# Patient Record
Sex: FEMALE | Race: WHITE | NOT HISPANIC OR LATINO | Employment: OTHER | URBAN - METROPOLITAN AREA
[De-identification: names, ages, dates, MRNs, and addresses within clinical notes are randomized per-mention and may not be internally consistent; named-entity substitution may affect disease eponyms.]

---

## 2017-01-09 ENCOUNTER — ALLSCRIPTS OFFICE VISIT (OUTPATIENT)
Dept: OTHER | Facility: OTHER | Age: 57
End: 2017-01-09

## 2017-01-11 ENCOUNTER — HOSPITAL ENCOUNTER (EMERGENCY)
Facility: HOSPITAL | Age: 57
Discharge: HOME | End: 2017-01-11
Attending: EMERGENCY MEDICINE | Admitting: EMERGENCY MEDICINE
Payer: COMMERCIAL

## 2017-01-11 ENCOUNTER — APPOINTMENT (EMERGENCY)
Dept: RADIOLOGY | Facility: HOSPITAL | Age: 57
End: 2017-01-11
Payer: COMMERCIAL

## 2017-01-11 VITALS
HEART RATE: 72 BPM | SYSTOLIC BLOOD PRESSURE: 122 MMHG | DIASTOLIC BLOOD PRESSURE: 67 MMHG | OXYGEN SATURATION: 98 % | TEMPERATURE: 98.2 F | WEIGHT: 200.06 LBS | RESPIRATION RATE: 15 BRPM

## 2017-01-11 DIAGNOSIS — M25.569 KNEE PAIN, ACUTE: Primary | ICD-10-CM

## 2017-01-11 PROCEDURE — 99283 EMERGENCY DEPT VISIT LOW MDM: CPT

## 2017-01-11 PROCEDURE — 73564 X-RAY EXAM KNEE 4 OR MORE: CPT

## 2017-01-11 RX ORDER — FOLIC ACID/VIT B COMPLEX AND C 0.8 MG
1 TABLET ORAL DAILY
COMMUNITY
Start: 2014-12-02

## 2017-01-11 RX ORDER — MONTELUKAST SODIUM 10 MG/1
TABLET ORAL
COMMUNITY
Start: 2016-12-19

## 2017-01-11 RX ORDER — GABAPENTIN 100 MG/1
100 CAPSULE ORAL
COMMUNITY

## 2017-01-11 RX ORDER — ALBUTEROL SULFATE 90 UG/1
2 AEROSOL, METERED RESPIRATORY (INHALATION) EVERY 6 HOURS PRN
COMMUNITY

## 2017-01-11 RX ORDER — OMEPRAZOLE 20 MG/1
20 CAPSULE, DELAYED RELEASE ORAL DAILY PRN
COMMUNITY
Start: 2014-03-28

## 2017-01-11 RX ORDER — ASPIRIN 81 MG
TABLET, DELAYED RELEASE (ENTERIC COATED) ORAL 2 TIMES DAILY
COMMUNITY
Start: 2014-12-19

## 2017-01-19 ENCOUNTER — TRANSCRIBE ORDERS (OUTPATIENT)
Dept: ADMINISTRATIVE | Facility: HOSPITAL | Age: 57
End: 2017-01-19

## 2017-01-19 ENCOUNTER — APPOINTMENT (OUTPATIENT)
Dept: LAB | Facility: HOSPITAL | Age: 57
End: 2017-01-19
Payer: COMMERCIAL

## 2017-01-19 DIAGNOSIS — K29.70 GASTRITIS WITHOUT BLEEDING: ICD-10-CM

## 2017-01-19 DIAGNOSIS — Z79.899 ENCOUNTER FOR LONG-TERM (CURRENT) USE OF OTHER MEDICATIONS: ICD-10-CM

## 2017-01-19 DIAGNOSIS — E55.9 UNSPECIFIED VITAMIN D DEFICIENCY: ICD-10-CM

## 2017-01-19 DIAGNOSIS — Z98.84 BARIATRIC SURGERY STATUS: ICD-10-CM

## 2017-01-19 DIAGNOSIS — E78.5 HYPERLIPIDEMIA: ICD-10-CM

## 2017-01-19 DIAGNOSIS — K91.2 OTHER AND UNSPECIFIED POSTSURGICAL NONABSORPTION: ICD-10-CM

## 2017-01-19 DIAGNOSIS — Z79.899 ENCOUNTER FOR LONG-TERM (CURRENT) USE OF OTHER MEDICATIONS: Primary | ICD-10-CM

## 2017-01-19 DIAGNOSIS — E55.9 VITAMIN D DEFICIENCY: ICD-10-CM

## 2017-01-19 LAB
25(OH)D3 SERPL-MCNC: 40.6 NG/ML (ref 30–100)
ALBUMIN SERPL BCP-MCNC: 3.2 G/DL (ref 3.5–5)
ALP SERPL-CCNC: 79 U/L (ref 46–116)
ALT SERPL W P-5'-P-CCNC: 20 U/L (ref 12–78)
ANION GAP SERPL CALCULATED.3IONS-SCNC: 5 MMOL/L (ref 4–13)
AST SERPL W P-5'-P-CCNC: 18 U/L (ref 5–45)
BASOPHILS # BLD AUTO: 0.02 THOUSANDS/ΜL (ref 0–0.1)
BASOPHILS NFR BLD AUTO: 0 % (ref 0–1)
BILIRUB SERPL-MCNC: 0.48 MG/DL (ref 0.2–1)
BUN SERPL-MCNC: 12 MG/DL (ref 5–25)
CALCIUM SERPL-MCNC: 9 MG/DL (ref 8.3–10.1)
CHLORIDE SERPL-SCNC: 107 MMOL/L (ref 100–108)
CHOLEST SERPL-MCNC: 172 MG/DL (ref 50–200)
CO2 SERPL-SCNC: 30 MMOL/L (ref 21–32)
CREAT SERPL-MCNC: 0.7 MG/DL (ref 0.6–1.3)
EOSINOPHIL # BLD AUTO: 0.09 THOUSAND/ΜL (ref 0–0.61)
EOSINOPHIL NFR BLD AUTO: 2 % (ref 0–6)
ERYTHROCYTE [DISTWIDTH] IN BLOOD BY AUTOMATED COUNT: 13.2 % (ref 11.6–15.1)
EST. AVERAGE GLUCOSE BLD GHB EST-MCNC: 114 MG/DL
FERRITIN SERPL-MCNC: 47 NG/ML (ref 8–388)
FOLATE SERPL-MCNC: >20 NG/ML (ref 3.1–17.5)
GFR SERPL CREATININE-BSD FRML MDRD: >60 ML/MIN/1.73SQ M
GLUCOSE SERPL-MCNC: 83 MG/DL (ref 65–140)
HBA1C MFR BLD: 5.6 % (ref 4.2–6.3)
HCT VFR BLD AUTO: 38.4 % (ref 34.8–46.1)
HDLC SERPL-MCNC: 68 MG/DL (ref 40–60)
HGB BLD-MCNC: 12.6 G/DL (ref 11.5–15.4)
IRON SATN MFR SERPL: 31 %
IRON SERPL-MCNC: 112 UG/DL (ref 50–170)
LDLC SERPL CALC-MCNC: 96 MG/DL (ref 0–100)
LYMPHOCYTES # BLD AUTO: 1.45 THOUSANDS/ΜL (ref 0.6–4.47)
LYMPHOCYTES NFR BLD AUTO: 27 % (ref 14–44)
MCH RBC QN AUTO: 31.5 PG (ref 26.8–34.3)
MCHC RBC AUTO-ENTMCNC: 32.8 G/DL (ref 31.4–37.4)
MCV RBC AUTO: 96 FL (ref 82–98)
MONOCYTES # BLD AUTO: 0.51 THOUSAND/ΜL (ref 0.17–1.22)
MONOCYTES NFR BLD AUTO: 10 % (ref 4–12)
NEUTROPHILS # BLD AUTO: 3.26 THOUSANDS/ΜL (ref 1.85–7.62)
NEUTS SEG NFR BLD AUTO: 61 % (ref 43–75)
NRBC BLD AUTO-RTO: 0 /100 WBCS
PLATELET # BLD AUTO: 210 THOUSANDS/UL (ref 149–390)
PMV BLD AUTO: 12.3 FL (ref 8.9–12.7)
POTASSIUM SERPL-SCNC: 4 MMOL/L (ref 3.5–5.3)
PROT SERPL-MCNC: 7.1 G/DL (ref 6.4–8.2)
PTH-INTACT SERPL-MCNC: 47.3 PG/ML (ref 14–72)
RBC # BLD AUTO: 4 MILLION/UL (ref 3.81–5.12)
SODIUM SERPL-SCNC: 142 MMOL/L (ref 136–145)
T4 FREE SERPL-MCNC: 0.96 NG/DL (ref 0.76–1.46)
TIBC SERPL-MCNC: 356 UG/DL (ref 250–450)
TRIGL SERPL-MCNC: 41 MG/DL
TSH SERPL DL<=0.05 MIU/L-ACNC: 0.89 UIU/ML (ref 0.36–3.74)
VIT B12 SERPL-MCNC: 1077 PG/ML (ref 100–900)
WBC # BLD AUTO: 5.33 THOUSAND/UL (ref 4.31–10.16)

## 2017-01-19 PROCEDURE — 82728 ASSAY OF FERRITIN: CPT

## 2017-01-19 PROCEDURE — 84443 ASSAY THYROID STIM HORMONE: CPT

## 2017-01-19 PROCEDURE — 80053 COMPREHEN METABOLIC PANEL: CPT

## 2017-01-19 PROCEDURE — 85025 COMPLETE CBC W/AUTO DIFF WBC: CPT

## 2017-01-19 PROCEDURE — 82306 VITAMIN D 25 HYDROXY: CPT

## 2017-01-19 PROCEDURE — 84590 ASSAY OF VITAMIN A: CPT

## 2017-01-19 PROCEDURE — 36415 COLL VENOUS BLD VENIPUNCTURE: CPT

## 2017-01-19 PROCEDURE — 83970 ASSAY OF PARATHORMONE: CPT

## 2017-01-19 PROCEDURE — 83540 ASSAY OF IRON: CPT

## 2017-01-19 PROCEDURE — 82746 ASSAY OF FOLIC ACID SERUM: CPT

## 2017-01-19 PROCEDURE — 83550 IRON BINDING TEST: CPT

## 2017-01-19 PROCEDURE — 84439 ASSAY OF FREE THYROXINE: CPT

## 2017-01-19 PROCEDURE — 82607 VITAMIN B-12: CPT

## 2017-01-19 PROCEDURE — 83036 HEMOGLOBIN GLYCOSYLATED A1C: CPT

## 2017-01-19 PROCEDURE — 80061 LIPID PANEL: CPT

## 2017-01-22 LAB — VIT A SERPL-MCNC: 45 UG/DL (ref 20–65)

## 2017-02-02 ENCOUNTER — GENERIC CONVERSION - ENCOUNTER (OUTPATIENT)
Dept: OTHER | Facility: OTHER | Age: 57
End: 2017-02-02

## 2017-02-24 ENCOUNTER — HOSPITAL ENCOUNTER (EMERGENCY)
Facility: HOSPITAL | Age: 57
Discharge: HOME | End: 2017-02-24
Admitting: EMERGENCY MEDICINE
Payer: COMMERCIAL

## 2017-02-24 ENCOUNTER — GENERIC CONVERSION - ENCOUNTER (OUTPATIENT)
Dept: OTHER | Facility: OTHER | Age: 57
End: 2017-02-24

## 2017-02-24 ENCOUNTER — APPOINTMENT (EMERGENCY)
Dept: RADIOLOGY | Facility: HOSPITAL | Age: 57
End: 2017-02-24
Payer: COMMERCIAL

## 2017-02-24 VITALS
DIASTOLIC BLOOD PRESSURE: 60 MMHG | TEMPERATURE: 98.4 F | RESPIRATION RATE: 16 BRPM | SYSTOLIC BLOOD PRESSURE: 128 MMHG | WEIGHT: 195 LBS | OXYGEN SATURATION: 98 % | HEART RATE: 60 BPM

## 2017-02-24 DIAGNOSIS — S30.0XXA COCCYX CONTUSION, INITIAL ENCOUNTER: Primary | ICD-10-CM

## 2017-02-24 PROCEDURE — 99283 EMERGENCY DEPT VISIT LOW MDM: CPT

## 2017-02-24 PROCEDURE — 72220 X-RAY EXAM SACRUM TAILBONE: CPT

## 2017-02-24 RX ORDER — ACETAMINOPHEN 325 MG/1
975 TABLET ORAL ONCE
Status: COMPLETED | OUTPATIENT
Start: 2017-02-24 | End: 2017-02-24

## 2017-02-24 RX ADMIN — ACETAMINOPHEN 975 MG: 325 TABLET, FILM COATED ORAL at 16:54

## 2017-02-27 ENCOUNTER — ALLSCRIPTS OFFICE VISIT (OUTPATIENT)
Dept: OTHER | Facility: OTHER | Age: 57
End: 2017-02-27

## 2017-03-02 ENCOUNTER — ALLSCRIPTS OFFICE VISIT (OUTPATIENT)
Dept: OTHER | Facility: OTHER | Age: 57
End: 2017-03-02

## 2017-03-02 ENCOUNTER — GENERIC CONVERSION - ENCOUNTER (OUTPATIENT)
Dept: OTHER | Facility: OTHER | Age: 57
End: 2017-03-02

## 2017-03-08 ENCOUNTER — ALLSCRIPTS OFFICE VISIT (OUTPATIENT)
Dept: OTHER | Facility: OTHER | Age: 57
End: 2017-03-08

## 2017-03-10 ENCOUNTER — ALLSCRIPTS OFFICE VISIT (OUTPATIENT)
Dept: OTHER | Facility: OTHER | Age: 57
End: 2017-03-10

## 2017-04-07 ENCOUNTER — ALLSCRIPTS OFFICE VISIT (OUTPATIENT)
Dept: OTHER | Facility: OTHER | Age: 57
End: 2017-04-07

## 2017-04-11 ENCOUNTER — GENERIC CONVERSION - ENCOUNTER (OUTPATIENT)
Dept: OTHER | Facility: OTHER | Age: 57
End: 2017-04-11

## 2017-06-18 ENCOUNTER — HOSPITAL ENCOUNTER (EMERGENCY)
Facility: HOSPITAL | Age: 57
Discharge: HOME | End: 2017-06-18
Payer: COMMERCIAL

## 2017-06-18 ENCOUNTER — APPOINTMENT (EMERGENCY)
Dept: RADIOLOGY | Facility: HOSPITAL | Age: 57
End: 2017-06-18
Payer: COMMERCIAL

## 2017-06-18 VITALS
HEART RATE: 61 BPM | RESPIRATION RATE: 18 BRPM | TEMPERATURE: 99 F | WEIGHT: 199 LBS | SYSTOLIC BLOOD PRESSURE: 128 MMHG | OXYGEN SATURATION: 97 % | DIASTOLIC BLOOD PRESSURE: 77 MMHG

## 2017-06-18 DIAGNOSIS — S49.91XA RIGHT SHOULDER INJURY, INITIAL ENCOUNTER: Primary | ICD-10-CM

## 2017-06-18 PROCEDURE — 99283 EMERGENCY DEPT VISIT LOW MDM: CPT

## 2017-06-18 PROCEDURE — 73030 X-RAY EXAM OF SHOULDER: CPT

## 2017-06-18 RX ORDER — TRAMADOL HYDROCHLORIDE 50 MG/1
50 TABLET ORAL EVERY 6 HOURS PRN
Qty: 12 TABLET | Refills: 0 | Status: SHIPPED | OUTPATIENT
Start: 2017-06-18 | End: 2017-06-21

## 2017-07-18 ENCOUNTER — ALLSCRIPTS OFFICE VISIT (OUTPATIENT)
Dept: OTHER | Facility: OTHER | Age: 57
End: 2017-07-18

## 2017-07-18 DIAGNOSIS — M25.511 PAIN IN RIGHT SHOULDER: ICD-10-CM

## 2017-07-18 DIAGNOSIS — Z01.419 ENCOUNTER FOR GYNECOLOGICAL EXAMINATION WITHOUT ABNORMAL FINDING: ICD-10-CM

## 2017-07-18 DIAGNOSIS — Z12.31 ENCOUNTER FOR SCREENING MAMMOGRAM FOR MALIGNANT NEOPLASM OF BREAST: ICD-10-CM

## 2017-07-18 DIAGNOSIS — Z72.51 HIGH RISK HETEROSEXUAL BEHAVIOR: ICD-10-CM

## 2017-07-18 DIAGNOSIS — M54.50 LOW BACK PAIN: ICD-10-CM

## 2017-07-24 ENCOUNTER — APPOINTMENT (OUTPATIENT)
Dept: PHYSICAL THERAPY | Facility: CLINIC | Age: 57
End: 2017-07-24
Payer: COMMERCIAL

## 2017-07-24 DIAGNOSIS — M25.511 PAIN IN RIGHT SHOULDER: ICD-10-CM

## 2017-07-24 DIAGNOSIS — M54.50 LOW BACK PAIN: ICD-10-CM

## 2017-07-24 PROCEDURE — 97161 PT EVAL LOW COMPLEX 20 MIN: CPT

## 2017-07-25 ENCOUNTER — APPOINTMENT (OUTPATIENT)
Dept: PHYSICAL THERAPY | Facility: CLINIC | Age: 57
End: 2017-07-25
Payer: COMMERCIAL

## 2017-07-26 ENCOUNTER — APPOINTMENT (OUTPATIENT)
Dept: PHYSICAL THERAPY | Facility: CLINIC | Age: 57
End: 2017-07-26
Payer: COMMERCIAL

## 2017-07-26 PROCEDURE — 97110 THERAPEUTIC EXERCISES: CPT

## 2017-07-26 PROCEDURE — 97140 MANUAL THERAPY 1/> REGIONS: CPT

## 2017-08-02 ENCOUNTER — APPOINTMENT (OUTPATIENT)
Dept: PHYSICAL THERAPY | Facility: CLINIC | Age: 57
End: 2017-08-02
Payer: COMMERCIAL

## 2017-08-02 PROCEDURE — 97110 THERAPEUTIC EXERCISES: CPT

## 2017-08-02 PROCEDURE — 97140 MANUAL THERAPY 1/> REGIONS: CPT

## 2017-08-03 ENCOUNTER — APPOINTMENT (OUTPATIENT)
Dept: PHYSICAL THERAPY | Facility: CLINIC | Age: 57
End: 2017-08-03
Payer: COMMERCIAL

## 2017-08-03 PROCEDURE — 97140 MANUAL THERAPY 1/> REGIONS: CPT

## 2017-08-03 PROCEDURE — 97110 THERAPEUTIC EXERCISES: CPT

## 2017-08-11 ENCOUNTER — GENERIC CONVERSION - ENCOUNTER (OUTPATIENT)
Dept: OTHER | Facility: OTHER | Age: 57
End: 2017-08-11

## 2017-08-16 ENCOUNTER — APPOINTMENT (OUTPATIENT)
Dept: LAB | Facility: HOSPITAL | Age: 57
End: 2017-08-16
Payer: COMMERCIAL

## 2017-08-16 ENCOUNTER — ALLSCRIPTS OFFICE VISIT (OUTPATIENT)
Dept: OTHER | Facility: OTHER | Age: 57
End: 2017-08-16

## 2017-08-16 DIAGNOSIS — Z72.51 HIGH RISK HETEROSEXUAL BEHAVIOR: ICD-10-CM

## 2017-08-16 LAB
BILIRUB UR QL STRIP: NORMAL
CLARITY UR: NORMAL
COLOR UR: YELLOW
GLUCOSE (HISTORICAL): NORMAL
HGB UR QL STRIP.AUTO: NORMAL
KETONES UR STRIP-MCNC: NORMAL MG/DL
LEUKOCYTE ESTERASE UR QL STRIP: NORMAL
NITRITE UR QL STRIP: NORMAL
PH UR STRIP.AUTO: 7 [PH]
PROT UR STRIP-MCNC: NORMAL MG/DL
SP GR UR STRIP.AUTO: 1.01
UROBILINOGEN UR QL STRIP.AUTO: 0.2

## 2017-08-16 PROCEDURE — 87491 CHLMYD TRACH DNA AMP PROBE: CPT

## 2017-08-16 PROCEDURE — 87624 HPV HI-RISK TYP POOLED RSLT: CPT | Performed by: NURSE PRACTITIONER

## 2017-08-16 PROCEDURE — G0145 SCR C/V CYTO,THINLAYER,RESCR: HCPCS | Performed by: NURSE PRACTITIONER

## 2017-08-16 PROCEDURE — 87591 N.GONORRHOEAE DNA AMP PROB: CPT

## 2017-08-17 ENCOUNTER — LAB REQUISITION (OUTPATIENT)
Dept: LAB | Facility: HOSPITAL | Age: 57
End: 2017-08-17
Payer: COMMERCIAL

## 2017-08-17 DIAGNOSIS — Z01.419 ENCOUNTER FOR GYNECOLOGICAL EXAMINATION WITHOUT ABNORMAL FINDING: ICD-10-CM

## 2017-08-17 LAB
CHLAMYDIA DNA CVX QL NAA+PROBE: NORMAL
N GONORRHOEA DNA GENITAL QL NAA+PROBE: NORMAL

## 2017-08-22 LAB — HPV RRNA GENITAL QL NAA+PROBE: ABNORMAL

## 2017-08-24 LAB
LAB AP GYN PRIMARY INTERPRETATION: NORMAL
Lab: NORMAL
PATH INTERP SPEC-IMP: NORMAL

## 2017-08-25 ENCOUNTER — GENERIC CONVERSION - ENCOUNTER (OUTPATIENT)
Dept: OTHER | Facility: OTHER | Age: 57
End: 2017-08-25

## 2017-08-28 ENCOUNTER — GENERIC CONVERSION - ENCOUNTER (OUTPATIENT)
Dept: OTHER | Facility: OTHER | Age: 57
End: 2017-08-28

## 2017-09-21 ENCOUNTER — GENERIC CONVERSION - ENCOUNTER (OUTPATIENT)
Dept: OTHER | Facility: OTHER | Age: 57
End: 2017-09-21

## 2017-09-25 ENCOUNTER — GENERIC CONVERSION - ENCOUNTER (OUTPATIENT)
Dept: OTHER | Facility: OTHER | Age: 57
End: 2017-09-25

## 2018-01-10 NOTE — PROGRESS NOTES
Message  Pt left voicemail this am cancelling today's nutrition appointment  Active Problems    1  Abnormal chest CT (793 2) (R93 8)   2  Allergic rhinitis (477 9) (J30 9)   3  Asthma (493 90) (J45 909)   4  Back pain (724 5) (M54 9)   5  Carpal tunnel syndrome on both sides (354 0) (G56 01,G56 02)   6  Chills (780 64) (R68 83)   7  Chronic obstructive pulmonary disease (496) (J44 9)   8  Constipation (564 00) (K59 00)   9  Diabetes mellitus (250 00) (E11 9)   10  Edema (782 3) (R60 9)   11  Encounter for routine gynecological examination with Papanicolaou smear of cervix    (V72 31,V76 2) (Z01 419,Z12 4)   12  Encounter for screening mammogram for malignant neoplasm of breast (V76 12)    (Z12 31)   13  Fatigue (780 79) (R53 83)   14  Gastritis (535 50) (K29 70)   15  Group B streptococcal infection (041 02) (B95 1)   16  Herniated cervical disc (722 0) (M50 20)   17  Hyperlipidemia (272 4) (E78 5)   18  Left maxillary sinusitis (473 0) (J32 0)   19  Low back pain (724 2) (M54 5)   20  Lumbar radiculopathy (724 4) (M54 16)   21  Need for prophylactic vaccination and inoculation against influenza (V04 81) (Z23)   22  Nonspecific abnormal findings on radiological and examination of lung field (793 19)    (R91 8)   23  Obstructive sleep apnea (327 23) (G47 33)   24  Onychomycosis (110 1) (B35 1)   25  Osteoarthritis cervical spine (721 0) (M47 812)   26  Pain of lower leg, unspecified laterality (729 5) (M79 669)   27  Palpitations (785 1) (R00 2)   28  Periorbital cellulitis of right eye (682 0) (L03 211)   29  Postgastrectomy malabsorption (579 3) (K91 2)   30  Preoperative clearance (V72 84) (Z01 818)   31  Pulmonary embolism (415 19) (I26 99)   32  Right bundle branch block (RBBB) (426 4) (I45 10)   33  Right eye injury (921 9) (S05 91XA)   34  Screen for sexually transmitted diseases (V74 5) (Z11 3)   35  Shortness of breath (786 05) (R06 02)   36  Sinus bradycardia (427 89) (R00 1)   37   Status post bariatric surgery (V45 86) (Z98 84)   38  Subcutaneous cyst (706 2) (L72 9)   39  Tinea cruris (110 3) (B35 6)   40  Transient paralysis (781 4) (R29 5)   41  Trigger finger (727 03) (M65 30)   42  Vertigo (780 4) (R42)   43  Vitamin D deficiency (268 9) (E55 9)    Current Meds   1  Baclofen 10 MG Oral Tablet; take 1 tablet 3 times daily as needed for muscle spasm; Therapy: 28TFI5887 to (Paola Bowie)  Requested for: 73TLE9663; Last   Rx:62Zsh9815 Ordered   2  Calcium Citrate + D3 TABS; TAKE 1 TABLET 3 times daily; Therapy: (Recorded:17Nov2015) to Recorded   3  Clotrimazole 1 % External Cream (Lotrimin AF); APPLY 2-3 TIMES DAILY TO   AFFECTED AREA(S); Therapy: 68GNU2206 to (Berenice uHnter)  Requested for: 40Maz9019; Last   Rx:75Lcy1696; Status: ACTIVE - Renewal Denied Ordered   4  DocQLace 100 MG Oral Capsule; TAKE 1 CAPSULE TWICE DAILY; Therapy: 21PMW1060 to (Angle An)  Requested for: 56Osw7199; Last   Rx:43Azh5981 Ordered   5  Docusate Sodium 100 MG Oral Capsule; TAKE 1 CAPSULE TWICE DAILY; Therapy: 53CHQ3976 to (Evaluate:17Feb2016)  Requested for: 39ZIR2611; Last   Rx:20Oct2015 Ordered   6  FreeStyle Lancets Miscellaneous; Therapy: 78TJU2311 to (Evaluate:22Jun2014) Recorded   7  FreeStyle Lite Device; Therapy: 29OZZ6351 to (Evaluate:22Jun2014) Recorded   8  FreeStyle Lite Test In [employer]; Therapy: 22TCK9778 to (Evaluate:17Jun2014) Recorded   9  Loratadine 10 MG Oral Tablet; Take 1 tablet daily; Therapy: 16MKA1397 to (Brigitte Barreto)  Requested for: 56YNY8403; Last   AA:94FBN8755 Ordered   10  Montelukast Sodium 10 MG Oral Tablet; take 1 tablet at bedtime; Therapy: 68UJW5970 to (Brigitte Barreto)  Requested for: 45RKM9332; Last    Rx:07Jan2016 Ordered   6  Multivitamins CHEW Recorded   12  Nyamyc 692633 UNIT/GM External Powder; apply sparingly to affected area(s) 3 times a    day; Therapy: 48Uff9911 to ([phone])  Requested for: 45Tyr1979;  Last Rx:07Lka5599 Ordered   13  Nystatin 841986 UNIT/GM External Powder; APPLY SPARINGLY TO AFFECTED    AREA(S) 3 TIMES A DAY; Therapy: 09JOY9823 to ([phone])  Requested for: 27Aug2015; Last    Rx:27Aug2015 Ordered   14  Omeprazole 20 MG Oral Capsule Delayed Release; Take one capsule daily; Therapy: 59QYA7860 to (Evaluate:21Mar2016)  Requested for: 67FUU0060; Last    Rx:57Cov2556 Ordered   15  Sertraline HCl - 100 MG Oral Tablet; TAKE 1 AND 1/2 TABLETS DAILY; Therapy: (Recorded:29Jan2014) to Recorded   16  Symbicort 80-4 5 MCG/ACT Inhalation Aerosol; inhale 2 puffs twice daily  rinse mouth    after use; Therapy: 18IOV2978 to (Abril Miguel)  Requested for: 62UJR2934; Last    Rx:56Anu9850 Ordered   17  TraZODone HCl - 100 MG Oral Tablet; as directed; Therapy: (Recorded:17Nov2015) to Recorded   18  Ventolin  (90 Base) MCG/ACT Inhalation Aerosol Solution; INHALE 2 PUFFS    EVERY 4-6 HOURS AS NEEDED; Therapy: 40SYT0596 to (Complete:25Aug2016)  Requested for: 34HSM3900; Last    Rx:10Vhh2567; Status: ACTIVE - Renewal Denied Ordered   19  Xarelto 20 MG Oral Tablet; take one tablet daily with food; Therapy: 53FYH9024 to (Abril Miguel)  Requested for: 57DMQ9242; Last    Rx:76Uyx1601 Ordered    Allergies    1  Neosporin + Pain Relief Max St OINT    2   Adhesive Tape    Signatures   Electronically signed by : JOEY Rebolledo; Feb 11 2016 10:21AM EST                       (Author)

## 2018-01-11 NOTE — PROGRESS NOTES
Chief Complaint  Here for PPd read, noted as negative on left forearm  Pt  does not have any forms, state she will bring one on visit for 2 step PPd placement  ENRIQUETA Cobian  Active Problems    1  Abnormal chest CT (793 2) (R93 8)   2  Achilles tendinitis of left lower extremity (726 71) (M76 62)   3  Achilles tendinitis of right lower extremity (726 71) (M76 61)   4  Allergic rhinitis (477 9) (J30 9)   5  Anxiety (300 00) (F41 9)   6  Asthma (493 90) (J45 909)   7  Back pain (724 5) (M54 9)   8  Carpal tunnel syndrome on both sides (354 0) (G56 03)   9  Chest pain (786 50) (R07 9)   10  Chills (780 64) (R68 83)   11  Chronic obstructive pulmonary disease (496) (J44 9)   12  Colon cancer screening (V76 51) (Z12 11)   13  Constipation (564 00) (K59 00)   14  Depression (311) (F32 9)   15  Edema (782 3) (R60 9)   16  Encounter for diabetic foot exam (250 00) (E11 9)   17  Encounter for routine gynecological examination with Papanicolaou smear of cervix    (V72 31,V76 2) (Z01 419)   18  Encounter for screening mammogram for malignant neoplasm of breast (V76 12)    (Z12 31)   19  Fatigue (780 79) (R53 83)   20  Gastritis (535 50) (K29 70)   21  Group B streptococcal infection (041 02) (A49 1)   22  Herniated cervical disc (722 0) (M50 20)   23  Hyperlipidemia (272 4) (E78 5)   24  Incomplete right bundle branch block (426 4) (I45 10)   25  Iron deficiency anemia (280 9) (D50 9)   26  Left maxillary sinusitis (473 0) (J32 0)   27  Low back pain (724 2) (M54 5)   28  Lumbar radiculopathy (724 4) (M54 16)   29  Need for influenza vaccination (V04 81) (Z23)   30  Need for prophylactic vaccination and inoculation against influenza (V04 81) (Z23)   31  Nonspecific abnormal findings on radiological and examination of lung field (793 19)    (R91 8)   32  Obstructive sleep apnea (327 23) (G47 33)   33  Onychomycosis (110 1) (B35 1)   34  Osteoarthritis cervical spine (721 0) (M47 812)   35   Pain of lower leg, unspecified laterality (729 5) (M79 669)   36  Palpitations (785 1) (R00 2)   37  Periorbital cellulitis of right eye (682 0) (L03 213)   38  Postgastrectomy malabsorption (579 3) (K91 2,Z90 3)   39  PPD screening test (V74 1) (Z11 1)   40  Preoperative clearance (V72 84) (Z01 818)   41  Pulmonary embolism (415 19) (I26 99)   42  Right eye injury (921 9) (S05 91XA)   43  Screen for sexually transmitted diseases (V74 5) (Z11 3)   44  Shortness of breath (786 05) (R06 02)   45  Sinus bradycardia (427 89) (R00 1)   46  Status post bariatric surgery (V45 86) (Z98 84)   47  Strain of Achilles tendon, left, initial encounter (845 09) (S86 012A)   48  Subcutaneous cyst (706 2) (L72 9)   49  Tinea cruris (110 3) (B35 6)   50  Transient paralysis (781 4) (R29 5)   51  Trigger finger (727 03) (M65 30)   52  Vertigo (780 4) (R42)   53  Visit for screening mammogram (V76 12) (Z12 31)   54  Vitamin D deficiency (268 9) (E55 9)   55  Well adult on routine health check (V70 0) (Z00 00)    Current Meds   1  Baclofen 10 MG Oral Tablet; take 1 tablet 3 times daily as needed for muscle spasm; Therapy: 64RHI2455 to (Evaluate:04Kne8360)  Requested for: 28YDS2578; Last   EC:56DKI7871 Ordered   2  Calcium Citrate + D3 TABS; TAKE 1 TABLET 3 times daily; Therapy: (Recorded:10Xyx8249) to Recorded   3  DocQLace 100 MG Oral Capsule; TAKE 1 CAPSULE TWICE DAILY; Therapy: 07ZYM2391 to (Evaluate:67Kcv8626)  Requested for: 70Oqc2123; Last   Rx:45Jrk5440 Ordered   4  Docusate Sodium 100 MG Oral Capsule; TAKE 1 CAPSULE TWICE DAILY; Therapy: 32OPN3699 to (Evaluate:62Uih4024)  Requested for: 70ZOI4293; Last   Rx:14Qhp9944 Ordered   5  FreeStyle Lancets Miscellaneous; Therapy: 72TRS7478 to (Evaluate:22Jun2014) Recorded   6  FreeStyle Lite Device; Therapy: 23CVT8635 to (Evaluate:22Jun2014) Recorded   7  FreeStyle Lite Test In [employer]; Therapy: 63PPD3587 to (Evaluate:17Jun2014) Recorded   8   Iron Supplement 325 (65 Fe) MG TABS; TAKE 1 TABLET DAILY AS DIRECTED; Therapy: 32Xso4157 to (Evaluate:76Fnb1472)  Requested for: 64Gcv9999; Last   Rx:31Slp4870 Ordered   9  Loratadine 10 MG Oral Tablet; TAKE 1 TABLET DAILY  Patient to contact PCP for   additional refills; Therapy: 09VWG4912 to (Elizabeth Preston)  Requested for: 84CXS0079; Last   Rx:09Jan2017 Ordered   10  Montelukast Sodium 10 MG Oral Tablet; TAKE 1 TABLET IN THE EVENING; Therapy: 30YXP7249 to (Evaluate:17Jun2017)  Requested for: 16SQT3597; Last    Rx:02Hsb6653 Ordered   11  Multivitamins CHEW Recorded   12  Senna Laxative 8 6 MG Oral Tablet; Take 1 tablet by mouth at bedtime as needed for    constipation; Therapy: 43Qee7581 to (Evaluate:00Tai4328)  Requested for: 05ERH0698; Last    Rx:29Rnu8061 Ordered   13  Sertraline HCl - 100 MG Oral Tablet; TAKE 1 AND 1/2 TABLETS DAILY; Therapy: (Recorded:29Jan2014) to Recorded   14  TraZODone HCl - 100 MG Oral Tablet; as directed; Therapy: (Recorded:17Nov2015) to Recorded   15  Ventolin  (90 Base) MCG/ACT Inhalation Aerosol Solution; INHALE 2 PUFFS    EVERY 4-6 HOURS AS NEEDED; Therapy: 98XYX7271 to (UJIJOGAB:75VSB6626)  Requested for: 74PWV1705; Last    Rx:09Jan2017 Ordered   16  Vitamin C 500 MG Oral Tablet; TAKE 1 TABLET DAILY; Therapy: 65Xtc0255 to (Elizabeth Preston)  Requested for: 35ZSG6878; Last    Rx:09Jan2017 Ordered   17  Vitamin D3 5000 UNIT Oral Capsule; TAKE 1 CAPSULE BY MOUTH EVERY DAY; Therapy: 23YSX6115 to (Evaluate:04Jan2018)  Requested for: 00NLN4654; Last    Rx:09Jan2017 Ordered   18  Xarelto 20 MG Oral Tablet; take one tablet daily with food; Therapy: 10YKB9074 to ([phone])  Requested for: 55IDW8765; Last    Rx:09Jan2017 Ordered    Allergies    1  Neosporin + Pain Relief Max St OINT    2  Adhesive Tape    Assessment    1   Well adult on routine health check (V70 0) (Z00 00)    Plan  Well adult on routine health check    · Follow-up visit in 1 year Evaluation and Treatment  Follow-up  Status: Complete  Done:  59NWG9793    Future Appointments    Date/Time Provider Specialty Site   03/07/2017 10:00 AM [facility]   03/09/2017 10:00 AM Jyotsna BARLOW, Nurse Schedule  [facility]   03/02/2017 01:00 PM Kenyon White, [facility] Family Medicine [facility]   03/02/2017 02:30 PM Jennifer Larry, [facility] General Surgery [facility]     Signatures   Electronically signed by : Ree Dance, [facility]; Feb 27 2017 10:53AM EST                       (Author)    Electronically signed by : EJAN Ornelas ; Feb 28 2017  3:28PM EST

## 2018-01-12 NOTE — PROGRESS NOTES
Assessment    1  Well adult on routine health check (V70 0) (Z00 00)    Plan  Well adult on routine health check    · Follow-up visit in 1 year Evaluation and Treatment  Follow-up  Status: Hold For -  Scheduling  Requested for: 57JDU3463    Discussion/Summary  Currently, she eats a healthy diet  Had a hysterectomy Breast cancer screening: mammogram is current  Colorectal cancer screening: Patient states that she had a colonoscopy done in 2014 prior to her bypass surgery  She states was done in [facility]  Patient discussion: discussed with the patient  With regards to her fall yesterday and left buttock pain, advised that she can go to the ER if symptoms worsen  Otherwise she can follow-up here next week for further evaluation and discuss constipation at that time too  In the meantime I did send a task to the clinical team to retrieve her colonoscopy results from 2014 at [facility]  Needs a two-step PPD for employment  PPD applied today and will follow up in 72 hours for reading  Patient denies any form needed for completion  Patient is able to Self-Care  The treatment plan was reviewed with the patient/guardian  The patient/guardian understands and agrees with the treatment plan   The patient was counseled regarding instructions for management, impressions  Self Referrals: No   Follow up with PCP/Referring Provider  Chief Complaint  States needs physical to do nurse aide course  History of Present Illness  HM, Adult Female: The patient is being seen for a health maintenance and 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  Also needs a two-step PPD evaluation  General Health: The patient's health since the last visit is described as good  She does not have regular dental visits  The patient reports her last dental visit was yrs ago  She complains of vision problems  She has hearing loss  hearing is slightly decreased   rt ear due to accident 14 yrs ago  Lifestyle:  She consumes a diverse and healthy diet  She does not have any weight concerns  She exercises regularly  She does not use tobacco  She denies alcohol use  She denies drug use  Reproductive health:  had hysterectomy  Screening:      Review of Systems    Constitutional: No fever, no chills, feels well, no tiredness, no recent weight gain or weight loss  Eyes: eyesight problems  ENT: no complaints of earache, no loss of hearing, no nose bleeds, no nasal discharge, no sore throat, no hoarseness  Cardiovascular: No complaints of slow heart rate, no fast heart rate, no chest pain, no palpitations, no leg claudication, no lower extremity edema  Respiratory: No complaints of shortness of breath, no wheezing, no cough, no SOB on exertion, no orthopnea, no PND  Musculoskeletal: Patient states that she fell outside on her left buttock last evening  She feels as though it may be more muscle pain  No evaluation  Active Problems    1  Abnormal chest CT (793 2) (R93 8)   2  Achilles tendinitis of left lower extremity (726 71) (M76 62)   3  Achilles tendinitis of right lower extremity (726 71) (M76 61)   4  Allergic rhinitis (477 9) (J30 9)   5  Anxiety (300 00) (F41 9)   6  Asthma (493 90) (J45 909)   7  Back pain (724 5) (M54 9)   8  Carpal tunnel syndrome on both sides (354 0) (G56 03)   9  Chest pain (786 50) (R07 9)   10  Chills (780 64) (R68 83)   11  Chronic obstructive pulmonary disease (496) (J44 9)   12  Colon cancer screening (V76 51) (Z12 11)   13  Constipation (564 00) (K59 00)   14  Depression (311) (F32 9)   15  Edema (782 3) (R60 9)   16  Encounter for diabetic foot exam (250 00) (E11 9)   17  Encounter for routine gynecological examination with Papanicolaou smear of cervix    (V72 31,V76 2) (Z01 419)   18  Encounter for screening mammogram for malignant neoplasm of breast (V76 12)    (Z12 31)   19  Fatigue (780 79) (R53 83)   20  Gastritis (535 50) (K29 70)   21   Group B streptococcal infection (041 02) (A49 1)   22  Herniated cervical disc (722 0) (M50 20)   23  Hyperlipidemia (272 4) (E78 5)   24  Incomplete right bundle branch block (426 4) (I45 10)   25  Iron deficiency anemia (280 9) (D50 9)   26  Left maxillary sinusitis (473 0) (J32 0)   27  Low back pain (724 2) (M54 5)   28  Lumbar radiculopathy (724 4) (M54 16)   29  Need for influenza vaccination (V04 81) (Z23)   30  Need for prophylactic vaccination and inoculation against influenza (V04 81) (Z23)   31  Nonspecific abnormal findings on radiological and examination of lung field (793 19)    (R91 8)   32  Obstructive sleep apnea (327 23) (G47 33)   33  Onychomycosis (110 1) (B35 1)   34  Osteoarthritis cervical spine (721 0) (M47 812)   35  Pain of lower leg, unspecified laterality (729 5) (M79 669)   36  Palpitations (785 1) (R00 2)   37  Periorbital cellulitis of right eye (682 0) (L03 213)   38  Postgastrectomy malabsorption (579 3) (K91 2,Z90 3)   39  Preoperative clearance (V72 84) (Z01 818)   40  Pulmonary embolism (415 19) (I26 99)   41  Right eye injury (921 9) (S05 91XA)   42  Screen for sexually transmitted diseases (V74 5) (Z11 3)   43  Shortness of breath (786 05) (R06 02)   44  Sinus bradycardia (427 89) (R00 1)   45  Status post bariatric surgery (V45 86) (Z98 84)   46  Strain of Achilles tendon, left, initial encounter (845 09) (S86 012A)   47  Subcutaneous cyst (706 2) (L72 9)   48  Tinea cruris (110 3) (B35 6)   49  Transient paralysis (781 4) (R29 5)   50  Trigger finger (727 03) (M65 30)   51  Vertigo (780 4) (R42)   52  Visit for screening mammogram (V76 12) (Z12 31)   53   Vitamin D deficiency (268 9) (E55 9)    Past Medical History    · History of Cardiac Cath Summary   · History of Cellulitis (682 9) (L03 90)   · History of Cellulitis of leg (682 6) (L03 119)   · History of Depression (311) (F32 9)   · History of Diabetes mellitus type II, controlled (250 00) (E11 9)   · History of hypertension (V12 59) (Z86 79)   · History of Morbid or severe obesity due to excess calories (278 01) (E66 01)   · Need for prophylactic vaccination and inoculation against influenza (V04 81) (Z23)   · History of Obstructive sleep apnea (327 23) (G47 33)   · History of Pulmonary Embolism (V12 55)    Surgical History    · History of Cholecystectomy   · History of Colon Surgery   · History of Gastric Surgery For Morbid Obesity Gastric Bypass   · History of Hysterectomy    Family History  Mother    · Patient's father is  (V24 11) (Z80 80)   · Patient's mother is  (V24 11) (Z80 80)  Father    · Family history of Chronic Renal Failure   · Family history of cardiac disorder (V17 49) (Z82 49)   · Patient's father is  (V24 11) (Z80 80)   · Patient's mother is  (V24 11) (Z80 80)  Sister    · Family history of Diabetes Mellitus (V18 0)  Family History    · Family history of Asthma (V17 5)   · Family history of Breast Cancer (V16 3)   · Family history of Depression   · Family history of Factor V Leiden Mutation, Hypercoagulation   · Family history of Reported Family History Of Heart Disease    Social History    · Daily Coffee Consumption (2  Cups/Day)   · Denied: History of Drug Use   · Former smoker (V15 82) (E46 470)   · Quit over 27 years ago  Lajean Cassette Lajean Cassette Smoked for only about a year, socially  Lajean Cassette Lajean Cassette Less than a pack of      cigarettes a day  Lajean Cassette Lajean Cassette Now around second hand smoke on a daily basis  · Never Drank Alcohol   · No preference on Latter day beliefs    Current Meds   1  Baclofen 10 MG Oral Tablet; take 1 tablet 3 times daily as needed for muscle spasm; Therapy: 59COY3437 to (Evaluate:64Duz7300)  Requested for: 09TTW5839; Last   OO:13VFZ8238 Ordered   2  Calcium Citrate + D3 TABS; TAKE 1 TABLET 3 times daily; Therapy: (Recorded:62Myq4772) to Recorded   3  DocQLace 100 MG Oral Capsule; TAKE 1 CAPSULE TWICE DAILY; Therapy: 69IUP0769 to (Evaluate:00Zpu2430)  Requested for: 99Ggj3137; Last   Rx:04Ngr7211 Ordered   4  Docusate Sodium 100 MG Oral Capsule; TAKE 1 CAPSULE TWICE DAILY; Therapy: 81UDQ2105 to (Evaluate:17Feb2016)  Requested for: 14OZB5064; Last   Rx:20Oct2015 Ordered   5  FreeStyle Lancets Miscellaneous; Therapy: 03HTY7391 to (Evaluate:22Jun2014) Recorded   6  FreeStyle Lite Device; Therapy: 85FOW1230 to (Evaluate:22Jun2014) Recorded   7  FreeStyle Lite Test In [employer]; Therapy: 32LNZ9041 to (Evaluate:17Jun2014) Recorded   8  Iron Supplement 325 (65 Fe) MG TABS; TAKE 1 TABLET DAILY AS DIRECTED; Therapy: 73Vli4949 to (Evaluate:20Aug2016)  Requested for: 26Bgt1935; Last   Rx:22Feb2016 Ordered   9  Loratadine 10 MG Oral Tablet; TAKE 1 TABLET DAILY  Patient to contact PCP for   additional refills; Therapy: 29OAR1896 to (Lorraine Parker)  Requested for: 52ESI9091; Last   Rx:09Jan2017 Ordered   10  Montelukast Sodium 10 MG Oral Tablet; TAKE 1 TABLET IN THE EVENING; Therapy: 85UDV8704 to (Evaluate:17Jun2017)  Requested for: 07DML9537; Last    Rx:97Shn3712 Ordered   11  Multivitamins CHEW Recorded   12  Senna Laxative 8 6 MG Oral Tablet; Take 1 tablet by mouth at bedtime as needed for    constipation; Therapy: 76Nfx9696 to (Evaluate:08Aug2016)  Requested for: 77DKM2406; Last    Rx:07Sir7419 Ordered   13  Sertraline HCl - 100 MG Oral Tablet; TAKE 1 AND 1/2 TABLETS DAILY; Therapy: (Recorded:29Jan2014) to Recorded   14  TraZODone HCl - 100 MG Oral Tablet; as directed; Therapy: (Recorded:17Nov2015) to Recorded   15  Ventolin  (90 Base) MCG/ACT Inhalation Aerosol Solution; INHALE 2 PUFFS    EVERY 4-6 HOURS AS NEEDED; Therapy: 52KPU8348 to (BJVEJZOA:06XQO8835)  Requested for: 14NQB9393; Last    Rx:09Jan2017 Ordered   16  Vitamin C 500 MG Oral Tablet; TAKE 1 TABLET DAILY; Therapy: 21Ido7289 to (Lorraine Parker)  Requested for: 31EMB7167; Last    Rx:09Jan2017 Ordered   17  Vitamin D3 5000 UNIT Oral Capsule; TAKE 1 CAPSULE BY MOUTH EVERY DAY;     Therapy: 98SIJ9104 to (Evaluate:04Jan2018) Requested for: 12DDY8231; Last    Rx:09Jan2017 Ordered   18  Xarelto 20 MG Oral Tablet; take one tablet daily with food; Therapy: 01WEK4700 to ([phone])  Requested for: 82NIZ4377; Last    Rx:09Jan2017 Ordered    Allergies    1  Neosporin + Pain Relief Max St OINT    2  Adhesive Tape    Physical Exam    Constitutional   General appearance: Abnormal   well developed, appears healthy, well nourished, obese and well hydrated  Ears, Nose, Mouth, and Throat   External inspection of ears and nose: Normal     Otoscopic examination: Tympanic membranes translucent with normal light reflex  Canals patent without erythema  Oropharynx: Normal with no erythema, edema, exudate or lesions  Neck   Neck: Supple, symmetric, trachea midline, no masses  Thyroid: Normal, no thyromegaly  Pulmonary   Respiratory effort: No increased work of breathing or signs of respiratory distress  Auscultation of lungs: Clear to auscultation  Cardiovascular   Auscultation of heart: Normal rate and rhythm, normal S1 and S2, no murmurs  Abdomen   Abdomen: Abnormal   Good bowel sounds, soft, mild diffuse tenderness  Patient relates her pain to constipation  Liver and spleen: No hepatomegaly or splenomegaly        Future Appointments    Date/Time Provider Specialty Site   02/27/2017 11:00 AM Carmelita BARLOW, Nurse Schedule  [facility]   03/02/2017 02:30 PM Felisha Larry, [facility] General Surgery [facility]     Signatures   Electronically signed by : Evone Phoenix, [facility]; Feb 24 2017  2:15PM EST                       (Author)    Electronically signed by : JEAN Henderson ; Feb 28 2017  3:27PM EST

## 2018-01-13 VITALS
HEIGHT: 63 IN | WEIGHT: 193 LBS | DIASTOLIC BLOOD PRESSURE: 80 MMHG | SYSTOLIC BLOOD PRESSURE: 128 MMHG | BODY MASS INDEX: 34.2 KG/M2

## 2018-01-13 VITALS
RESPIRATION RATE: 18 BRPM | OXYGEN SATURATION: 99 % | HEIGHT: 63 IN | WEIGHT: 198 LBS | TEMPERATURE: 99.8 F | BODY MASS INDEX: 35.08 KG/M2 | HEART RATE: 72 BPM | SYSTOLIC BLOOD PRESSURE: 110 MMHG | DIASTOLIC BLOOD PRESSURE: 70 MMHG

## 2018-01-13 VITALS
WEIGHT: 203 LBS | TEMPERATURE: 97 F | BODY MASS INDEX: 34.66 KG/M2 | RESPIRATION RATE: 20 BRPM | OXYGEN SATURATION: 100 % | HEART RATE: 68 BPM | DIASTOLIC BLOOD PRESSURE: 68 MMHG | SYSTOLIC BLOOD PRESSURE: 102 MMHG | HEIGHT: 64 IN

## 2018-01-13 VITALS
SYSTOLIC BLOOD PRESSURE: 106 MMHG | DIASTOLIC BLOOD PRESSURE: 64 MMHG | WEIGHT: 201 LBS | HEART RATE: 72 BPM | HEIGHT: 63 IN | RESPIRATION RATE: 20 BRPM | TEMPERATURE: 97.2 F | BODY MASS INDEX: 35.61 KG/M2

## 2018-01-13 NOTE — RESULT NOTES
Verified Results  (1) THIN PREP PAP WITH IMAGING [id number] 12:11PM Nel Eden     Test Name Result Flag Reference   LAB AP CASE REPORT (Report)     Gynecologic Cytology Report            Case: [id number]                  Authorizing Provider: SOPHIA Alvarado Collected:      08/16/2017 1211        First Screen:     YOBANY Howard    Received:      08/21/2017 1211        Pathologist:      Winsome Valerio DO                               Specimen:  LIQUID-BASED PAP, SCREENING, Vaginal   HPV HIGH RISK RESULT (Report)     HPV, High Risk: HPV POS, HPV16 NEG, HPV18 NEG      Other High Risk HPV Positive, HPV 16 Negative, HPV 18 Negative  Specimen is positive for the DNA of any one of, or combination of the following high risk HPV types: 39,58,74,55,81,65,41,85,48,35,68,25  HPV types 16 and 18 DNA were undetectable or below the pre-set threshold  [employer]'s FDA approved Radha 4800 is utilized with strict adherence to the 's instruction  manual to test for the presence of High-Risk HPV DNA, as well as HPV 16 and HPV 18  This instrument  has been validated by our laboratory and/or by the   A negative result does not preclude the presence of HPV infection because results depend on adequate  specimen collection, absence of inhibitors and sufficient DNA to be detected  Additionally, HPV negative  results are not intended to prevent women from proceeding to colposcopy if clinically warranted  Positive HPV test results indicate the presence of any one or more of the high risk types, but since patients  are often co-infected with low-risk types it does not rule out the presence of low-risk types in patients  with mixed infections     LAB AP GYN PRIMARY INTERPRETATION      Epithelial cell abnormality  Electronically signed by Winsome Valerio DO on 8/24/2017 at 10:01 AM   LAB AP GYN INTERPRETATION      Atypical squamous cells of undetermined significance   LAB AP GYN SPECIMEN ADEQUACY Satisfactory for evaluation  Absence of endocervical/transformation zone component  LAB AP GYN NOTE      Interpretation performed at [facility], [address] [address]   LAB AP GYN ADDITIONAL INFORMATION (Report)     [employer]'s FDA approved ,  and ThinPrep Imaging System are   utilized with strict adherence to the 's instruction manual to   prepare gynecologic and non-gynecologic cytology specimens for the   production of ThinPrep slides as well as for gynecologic ThinPrep imaging  These processes have been validated by our laboratory and/or by the     The Pap test is not a diagnostic procedure and should not be used as the   sole means to detect cervical cancer  It is only a screening procedure to   aid in the detection of cervical cancer and its precursors  Both   false-negative and false-positive results have been experienced  Your   patient's test result should be interpreted in this context together with   the history and clinical findings     LAB AP LMP partial hysterectomy     partial hysterectomy

## 2018-01-13 NOTE — PROGRESS NOTES
Chief Complaint  Pt here today for PPD results  Negative @0MM [id number]      Active Problems    1  Abnormal chest CT (793 2) (R93 8)   2  Achilles tendinitis of left lower extremity (726 71) (M76 62)   3  Achilles tendinitis of right lower extremity (726 71) (M76 61)   4  Allergic rhinitis (477 9) (J30 9)   5  Anxiety (300 00) (F41 9)   6  Asthma (493 90) (J45 909)   7  Back pain (724 5) (M54 9)   8  Carpal tunnel syndrome on both sides (354 0) (G56 03)   9  Chest pain (786 50) (R07 9)   10  Chills (780 64) (R68 83)   11  Chronic obstructive pulmonary disease (496) (J44 9)   12  Colon cancer screening (V76 51) (Z12 11)   13  Constipation (564 00) (K59 00)   14  Depression (311) (F32 9)   15  Edema (782 3) (R60 9)   16  Encounter for diabetic foot exam (250 00) (E11 9)   17  Encounter for routine gynecological examination with Papanicolaou smear of cervix    (V72 31,V76 2) (Z01 419)   18  Encounter for screening mammogram for malignant neoplasm of breast (V76 12)    (Z12 31)   19  Fatigue (780 79) (R53 83)   20  Gastritis (535 50) (K29 70)   21  Group B streptococcal infection (041 02) (A49 1)   22  Herniated cervical disc (722 0) (M50 20)   23  Hyperlipidemia (272 4) (E78 5)   24  Incomplete right bundle branch block (426 4) (I45 10)   25  Iron deficiency anemia (280 9) (D50 9)   26  Left maxillary sinusitis (473 0) (J32 0)   27  Low back pain (724 2) (M54 5)   28  Lumbar radiculopathy (724 4) (M54 16)   29  Need for influenza vaccination (V04 81) (Z23)   30  Need for prophylactic vaccination and inoculation against influenza (V04 81) (Z23)   31  Nonspecific abnormal findings on radiological and examination of lung field (793 19)    (R91 8)   32  Obstructive sleep apnea (327 23) (G47 33)   33  Onychomycosis (110 1) (B35 1)   34  Osteoarthritis cervical spine (721 0) (M47 812)   35  Pain of lower leg, unspecified laterality (729 5) (M79 669)   36  Palpitations (785 1) (R00 2)   37   Periorbital cellulitis of right eye (682  0) (L03 213)   38  Postgastrectomy malabsorption (579 3) (K91 2,Z90 3)   39  PPD screening test (V74 1) (Z11 1)   40  Preoperative clearance (V72 84) (Z01 818)   41  Pulmonary embolism (415 19) (I26 99)   42  Right eye injury (921 9) (S05 91XA)   43  Screen for sexually transmitted diseases (V74 5) (Z11 3)   44  Shortness of breath (786 05) (R06 02)   45  Sinus bradycardia (427 89) (R00 1)   46  Status post bariatric surgery (V45 86) (Z98 84)   47  Strain of Achilles tendon, left, initial encounter (845 09) (S86 012A)   48  Subcutaneous cyst (706 2) (L72 9)   49  Tinea cruris (110 3) (B35 6)   50  Transient paralysis (781 4) (R29 5)   51  Trigger finger (727 03) (M65 30)   52  Vertigo (780 4) (R42)   53  Visit for screening mammogram (V76 12) (Z12 31)   54  Vitamin D deficiency (268 9) (E55 9)   55  Well adult on routine health check (V70 0) (Z00 00)    Current Meds   1  Baclofen 10 MG Oral Tablet; take 1 tablet 3 times daily as needed for muscle spasm; Therapy: 57IIE6534 to (Evaluate:60Aer6414)  Requested for: 96WKW4850; Last   EB:76WQU5322 Ordered   2  Calcium Citrate + D3 TABS; TAKE 1 TABLET 3 times daily; Therapy: (Recorded:17Nov2015) to Recorded   3  Docusate Sodium 100 MG Oral Capsule; TAKE ONE CAPSULE BY MOUTH TWICE A   DAY; Therapy: 80VHK8201 to (Evaluate:86Adm8285)  Requested for: 79STR2762; Last   Rx:02Mar2017 Ordered   4  FreeStyle Lancets Miscellaneous; Therapy: 10WIG2866 to (Evaluate:22Jun2014) Recorded   5  FreeStyle Lite Device; Therapy: 70KFD6992 to (Evaluate:22Jun2014) Recorded   6  FreeStyle Lite Test In [employer]; Therapy: 97OIJ1567 to (Evaluate:17Jun2014) Recorded   7  Loratadine 10 MG Oral Tablet; TAKE 1 TABLET DAILY  Patient to contact PCP for   additional refills; Therapy: 04MTD6963 to (Frederic Clancy)  Requested for: 57VAR2204; Last   Rx:09Jan2017 Ordered   8  Montelukast Sodium 10 MG Oral Tablet; TAKE 1 TABLET IN THE EVENING;    Therapy: 75EEE1186 to (Evaluate:17Jun2017)  Requested for: 15TCL1749; Last   Rx:28Bgl6577 Ordered   9  Multivitamins CHEW Recorded   10  Senna Laxative 8 6 MG Oral Tablet; Take 1 tablet by mouth at bedtime as needed for    constipation; Therapy: 78Rte9750 to (Evaluate:51Vnt1196)  Requested for: 10EAK6479; Last    Rx:02Mar2017 Ordered   11  Sertraline HCl - 100 MG Oral Tablet; TAKE 1 AND 1/2 TABLETS DAILY; Therapy: (Prudencio Verma) to Recorded   12  TraZODone HCl - 100 MG Oral Tablet; as directed; Therapy: (Recorded:17Nov2015) to Recorded   13  Ventolin  (90 Base) MCG/ACT Inhalation Aerosol Solution; INHALE 2 PUFFS    EVERY 4-6 HOURS AS NEEDED; Therapy: 63AGJ0148 to (HZHUNZQD:45SII0692)  Requested for: 56DRV5754; Last    Rx:09Jan2017 Ordered   14  Vitamin D3 5000 UNIT Oral Capsule; TAKE 1 CAPSULE BY MOUTH EVERY DAY; Therapy: 66RUQ1719 to (Evaluate:04Jan2018)  Requested for: 52XEI9457; Last    Rx:09Jan2017 Ordered   15  Xarelto 20 MG Oral Tablet; take one tablet daily with food; Therapy: 99CLF9180 to Jose Luis Gonzalez)  Requested for: 23RUD4069; Last    Rx:09Jan2017 Ordered    Allergies    1  Neosporin + Pain Relief Max St OINT    2   Adhesive Tape    Plan  PPD screening test    · PPD    Future Appointments    Date/Time Provider Specialty Site   04/04/2017 02:00 PM José Antonio Aaron MD [facility]   03/30/2017 10:00 AM Ju Larry, [facility] General Surgery [facility]     Signatures   Electronically signed by : AMARILIS Peterson; Mar 10 2017  2:06PM EST                       (Author)    Electronically signed by : JEAN Ramirez ; Mar 13 2017 12:30PM EST

## 2018-01-15 VITALS
RESPIRATION RATE: 18 BRPM | OXYGEN SATURATION: 100 % | SYSTOLIC BLOOD PRESSURE: 110 MMHG | HEART RATE: 72 BPM | DIASTOLIC BLOOD PRESSURE: 70 MMHG | TEMPERATURE: 97.1 F | HEIGHT: 64 IN | WEIGHT: 202.13 LBS | BODY MASS INDEX: 34.51 KG/M2

## 2018-01-16 NOTE — PROGRESS NOTES
Chief Complaint  Here for step 2 PPd placement, placed on right forearm, form for [facility] placed in Kelvin Lopez  [place]te[place]ETA  Active Problems    1  Abnormal chest CT (793 2) (R93 8)   2  Achilles tendinitis of left lower extremity (726 71) (M76 62)   3  Achilles tendinitis of right lower extremity (726 71) (M76 61)   4  Allergic rhinitis (477 9) (J30 9)   5  Anxiety (300 00) (F41 9)   6  Asthma (493 90) (J45 909)   7  Back pain (724 5) (M54 9)   8  Carpal tunnel syndrome on both sides (354 0) (G56 03)   9  Chest pain (786 50) (R07 9)   10  Chills (780 64) (R68 83)   11  Chronic obstructive pulmonary disease (496) (J44 9)   12  Colon cancer screening (V76 51) (Z12 11)   13  Constipation (564 00) (K59 00)   14  Depression (311) (F32 9)   15  Edema (782 3) (R60 9)   16  Encounter for diabetic foot exam (250 00) (E11 9)   17  Encounter for routine gynecological examination with Papanicolaou smear of cervix    (V72 31,V76 2) (Z01 419)   18  Encounter for screening mammogram for malignant neoplasm of breast (V76 12)    (Z12 31)   19  Fatigue (780 79) (R53 83)   20  Gastritis (535 50) (K29 70)   21  Group B streptococcal infection (041 02) (A49 1)   22  Herniated cervical disc (722 0) (M50 20)   23  Hyperlipidemia (272 4) (E78 5)   24  Incomplete right bundle branch block (426 4) (I45 10)   25  Iron deficiency anemia (280 9) (D50 9)   26  Left maxillary sinusitis (473 0) (J32 0)   27  Low back pain (724 2) (M54 5)   28  Lumbar radiculopathy (724 4) (M54 16)   29  Need for influenza vaccination (V04 81) (Z23)   30  Need for prophylactic vaccination and inoculation against influenza (V04 81) (Z23)   31  Nonspecific abnormal findings on radiological and examination of lung field (793 19)    (R91 8)   32  Obstructive sleep apnea (327 23) (G47 33)   33  Onychomycosis (110 1) (B35 1)   34  Osteoarthritis cervical spine (721 0) (M47 812)   35   Pain of lower leg, unspecified laterality (729 5) (M02 919) 36  Palpitations (785 1) (R00 2)   37  Periorbital cellulitis of right eye (682 0) (L03 213)   38  Postgastrectomy malabsorption (579 3) (K91 2,Z90 3)   39  PPD screening test (V74 1) (Z11 1)   40  Preoperative clearance (V72 84) (Z01 818)   41  Pulmonary embolism (415 19) (I26 99)   42  Right eye injury (921 9) (S05 91XA)   43  Screen for sexually transmitted diseases (V74 5) (Z11 3)   44  Shortness of breath (786 05) (R06 02)   45  Sinus bradycardia (427 89) (R00 1)   46  Status post bariatric surgery (V45 86) (Z98 84)   47  Strain of Achilles tendon, left, initial encounter (845 09) (S86 012A)   48  Subcutaneous cyst (706 2) (L72 9)   49  Tinea cruris (110 3) (B35 6)   50  Transient paralysis (781 4) (R29 5)   51  Trigger finger (727 03) (M65 30)   52  Vertigo (780 4) (R42)   53  Visit for screening mammogram (V76 12) (Z12 31)   54  Vitamin D deficiency (268 9) (E55 9)   55  Well adult on routine health check (V70 0) (Z00 00)    Current Meds   1  Baclofen 10 MG Oral Tablet; take 1 tablet 3 times daily as needed for muscle spasm; Therapy: 98UDN6150 to (Evaluate:62Qnx7767)  Requested for: 56YCL2173; Last   GF:38NGL6215 Ordered   2  Calcium Citrate + D3 TABS; TAKE 1 TABLET 3 times daily; Therapy: (Recorded:17Nov2015) to Recorded   3  Docusate Sodium 100 MG Oral Capsule; TAKE ONE CAPSULE BY MOUTH TWICE A   DAY; Therapy: 73MUQ7858 to (Evaluate:21Yuw2768)  Requested for: 59XOM7169; Last   Rx:02Mar2017 Ordered   4  FreeStyle Lancets Miscellaneous; Therapy: 94GQU5985 to (Evaluate:22Jun2014) Recorded   5  FreeStyle Lite Device; Therapy: 90PXS1286 to (Evaluate:22Jun2014) Recorded   6  FreeStyle Lite Test In [employer]; Therapy: 35DUU0458 to (Evaluate:17Jun2014) Recorded   7  Loratadine 10 MG Oral Tablet; TAKE 1 TABLET DAILY  Patient to contact PCP for   additional refills; Therapy: 22DQX8632 to (Jeana Raman)  Requested for: 03TNB0478; Last   Rx:09Jan2017 Ordered   8   Montelukast Sodium 10 MG Oral Tablet; TAKE 1 TABLET IN THE EVENING; Therapy: 84UTB5575 to (Evaluate:17Jun2017)  Requested for: 88TSX9113; Last   Rx:12Eaa1663 Ordered   9  Multivitamins CHEW Recorded   10  Senna Laxative 8 6 MG Oral Tablet; Take 1 tablet by mouth at bedtime as needed for    constipation; Therapy: 32Hmh9874 to (Evaluate:87Xtz9686)  Requested for: 39IGZ2281; Last    Rx:02Mar2017 Ordered   11  Sertraline HCl - 100 MG Oral Tablet; TAKE 1 AND 1/2 TABLETS DAILY; Therapy: (Umer Laredo) to Recorded   12  TraZODone HCl - 100 MG Oral Tablet; as directed; Therapy: (Recorded:17Nov2015) to Recorded   13  Ventolin  (90 Base) MCG/ACT Inhalation Aerosol Solution; INHALE 2 PUFFS    EVERY 4-6 HOURS AS NEEDED; Therapy: 77HSG9033 to (ZYXSYSVK:28GTT4614)  Requested for: 43ILB5401; Last    Rx:09Jan2017 Ordered   14  Vitamin D3 5000 UNIT Oral Capsule; TAKE 1 CAPSULE BY MOUTH EVERY DAY; Therapy: 46OSW6916 to (Evaluate:04Jan2018)  Requested for: 44EUC6208; Last    Rx:09Jan2017 Ordered   15  Xarelto 20 MG Oral Tablet; take one tablet daily with food; Therapy: 89VIB4312 to 0499 56 37 91)  Requested for: 75YMI2460; Last    Rx:09Jan2017 Ordered    Allergies    1  Neosporin + Pain Relief Max St OINT    2   Adhesive Tape    Plan   PPD; INJECT 0 1  ML Intradermal; Dose: 0 1Ml; Route: Intradermal; Site: Right Forearm; Done: 00QFP8542 10:23AM; Status: Complete - Retrospective By Protocol Authorization;  Ordered  For: PPD screening test; Ordered By:Cristal White; Effective Date:08Mar2017; Administered by: Alexandria Perez: 3/8/2017 10:23:00 AM; Last Updated By: Alexandria Perez; 3/13/2017 3:32:59 PM     Future Appointments    Date/Time Provider Specialty Site   04/04/2017 02:00 PM Roosevelt Mcleod MD Gastroenterology Adult [place] [address]   03/30/2017 10:00 AM Joselyn Phalen, [facility] General Surgery [facility]     Signatures   Electronically signed by : Rita Morgan [facility]; Mar 21 2017 12:05PM EST                       (Author)    Electronically signed by : JEAN Ornelas ; Mar 21 2017  2:33PM EST

## 2018-01-16 NOTE — MISCELLANEOUS
Message  spoke to patient on the phone  she had an abnormal pap, ascus and HPV +  she is to have a colpo done  patient transferred to  to schedule an apt      Active Problems    1  Abnormal chest CT (793 2) (R93 8)   2  Achilles tendinitis of left lower extremity (726 71) (M76 62)   3  Achilles tendinitis of right lower extremity (726 71) (M76 61)   4  Allergic rhinitis (477 9) (J30 9)   5  Anxiety (300 00) (F41 9)   6  Asthma (493 90) (J45 909)   7  Back pain (724 5) (M54 9)   8  Carpal tunnel syndrome on both sides (354 0) (G56 03)   9  Chest pain (786 50) (R07 9)   10  Chills (780 64) (R68 83)   11  Chronic obstructive pulmonary disease (496) (J44 9)   12  Colon cancer screening (V76 51) (Z12 11)   13  Constipation (564 00) (K59 00)   14  Depression (311) (F32 9)   15  Edema (782 3) (R60 9)   16  Encounter for diabetic foot exam (250 00) (E11 9)   17  Encounter for routine gynecological examination (V72 31) (Z01 419)   18  Encounter for routine gynecological examination with Papanicolaou smear of cervix    (V72 31,V76 2) (Z01 419)   19  Encounter for screening mammogram for malignant neoplasm of breast (V76 12)    (Z12 31)   20  Fatigue (780 79) (R53 83)   21  Gastritis (535 50) (K29 70)   22  Group B streptococcal infection (041 02) (A49 1)   23  Herniated cervical disc (722 0) (M50 20)   24  High risk sexual behavior (V69 2) (Z72 51)   25  Hyperlipidemia (272 4) (E78 5)   26  Incomplete right bundle branch block (426 4) (I45 10)   27  Left maxillary sinusitis (473 0) (J32 0)   28  Low back pain (724 2) (M54 5)   29  Lumbar radiculopathy (724 4) (M54 16)   30  Need for influenza vaccination (V04 81) (Z23)   31  Need for prophylactic vaccination and inoculation against influenza (V04 81) (Z23)   32  Nonspecific abnormal findings on radiological and examination of lung field (793 19)    (R91 8)   33  Obstructive sleep apnea (327 23) (G47 33)   34  Onychomycosis (110 1) (B35 1)   35   Osteoarthritis cervical spine (721 0) (M47 812)   36  Pain of lower leg, unspecified laterality (729 5) (M79 669)   37  Palpitations (785 1) (R00 2)   38  Periorbital cellulitis of right eye (682 0) (L03 213)   39  Postgastrectomy malabsorption (579 3) (K91 2,Z90 3)   40  PPD screening test (V74 1) (Z11 1)   41  Preoperative clearance (V72 84) (Z01 818)   42  Pulmonary embolism (415 19) (I26 99)   43  Reactive hypoglycemia (251 2) (E16 1)   44  Right eye injury (921 9) (S05 91XA)   45  Right shoulder pain (719 41) (M25 511)   46  Screen for sexually transmitted diseases (V74 5) (Z11 3)   47  Shortness of breath (786 05) (R06 02)   48  Sinus bradycardia (427 89) (R00 1)   49  Status post bariatric surgery (V45 86) (Z98 84)   50  Strain of Achilles tendon, left, initial encounter (845 09) (S86 012A)   51  Subcutaneous cyst (706 2) (L72 9)   52  Tinea cruris (110 3) (B35 6)   53  Transient paralysis (781 4) (R29 5)   54  Trigger finger (727 03) (M65 30)   55  UTI symptoms (788 99) (R39 9)   56  Vertigo (780 4) (R42)   57  Visit for screening mammogram (V76 12) (Z12 31)   58  Well adult on routine health check (V70 0) (Z00 00)    Current Meds   1  Baclofen 10 MG Oral Tablet; take 1 tablet 3 times daily as needed for muscle spasm; Therapy: 49XQQ3064 to (Evaluate:22Cmu2805)  Requested for: 58LDG0848; Last   QW:12ODY1860 Ordered   2  Calcium Citrate + D3 TABS; TAKE 1 TABLET 3 times daily; Therapy: (Recorded:17Nov2015) to Recorded   3  FreeStyle Lancets Miscellaneous; Therapy: 70LGW8823 to (Evaluate:22Jun2014) Recorded   4  FreeStyle Lite Device; Therapy: 47TBB6162 to (Evaluate:22Jun2014) Recorded   5  FreeStyle Lite Test In [employer]; Therapy: 79JIF2383 to (Evaluate:17Jun2014) Recorded   6  Multivitamins CHEW Recorded   7  Senna Laxative 8 6 MG Oral Tablet; Take 1 tablet by mouth at bedtime as needed for   constipation; Therapy: 49Dmq0298 to (Evaluate:31May2017)  Requested for: 24JFL4226; Last   Rx:02Mar2017 Ordered   8  Sertraline HCl - 100 MG Oral Tablet; TAKE 1 AND 1/2 TABLETS DAILY; Therapy: (Giselle Whalen) to Recorded   9  TraZODone HCl - 100 MG Oral Tablet; as directed; Therapy: (Recorded:17Nov2015) to Recorded   10  Ventolin  (90 Base) MCG/ACT Inhalation Aerosol Solution; INHALE 2 PUFFS    EVERY 4-6 HOURS AS NEEDED; Therapy: 61CPK8225 to (VGZLZLDP:93LWL2490)  Requested for: 93YPK3867; Last    Rx:09Jan2017 Ordered   11  Vitamin D3 5000 UNIT Oral Capsule; TAKE 1 CAPSULE BY MOUTH EVERY DAY; Therapy: 12CQG9797 to (Evaluate:04Jan2018)  Requested for: 80YHO0050; Last    Rx:09Jan2017 Ordered   12  Xarelto 20 MG Oral Tablet; take one tablet by mouth daily; Therapy: 22DBK4522 to (Evaluate:55Xez2305)  Requested for: 12Jun2017; Last    Rx:12Jun2017 Ordered    Allergies    1  Neosporin + Pain Relief Max St OINT    2   Adhesive Tape    Signatures   Electronically signed by : Bren Phillips RN; Aug 28 2017  1:16PM EST                       (Author)

## 2018-01-16 NOTE — PROGRESS NOTES
Message  Received referral from FLORENCIO Larry to follow up with pt for hypoglycemia  Called home phone # listed in [employer], left voicemail message with contact # and office hours for pt to return call  Active Problems    1  Abnormal chest CT (793 2) (R93 8)   2  Achilles tendinitis of left lower extremity (726 71) (M76 62)   3  Achilles tendinitis of right lower extremity (726 71) (M76 61)   4  Allergic rhinitis (477 9) (J30 9)   5  Anxiety (300 00) (F41 9)   6  Asthma (493 90) (J45 909)   7  Back pain (724 5) (M54 9)   8  Carpal tunnel syndrome on both sides (354 0) (G56 03)   9  Chest pain (786 50) (R07 9)   10  Chills (780 64) (R68 83)   11  Chronic obstructive pulmonary disease (496) (J44 9)   12  Colon cancer screening (V76 51) (Z12 11)   13  Constipation (564 00) (K59 00)   14  Depression (311) (F32 9)   15  Edema (782 3) (R60 9)   16  Encounter for diabetic foot exam (250 00) (E11 9)   17  Encounter for routine gynecological examination with Papanicolaou smear of cervix    (V72 31,V76 2) (Z01 419)   18  Encounter for screening mammogram for malignant neoplasm of breast (V76 12)    (Z12 31)   19  Fatigue (780 79) (R53 83)   20  Gastritis (535 50) (K29 70)   21  Group B streptococcal infection (041 02) (A49 1)   22  Herniated cervical disc (722 0) (M50 20)   23  Hyperlipidemia (272 4) (E78 5)   24  Incomplete right bundle branch block (426 4) (I45 10)   25  Left maxillary sinusitis (473 0) (J32 0)   26  Low back pain (724 2) (M54 5)   27  Lumbar radiculopathy (724 4) (M54 16)   28  Need for influenza vaccination (V04 81) (Z23)   29  Need for prophylactic vaccination and inoculation against influenza (V04 81) (Z23)   30  Nonspecific abnormal findings on radiological and examination of lung field (793 19)    (R91 8)   31  Obstructive sleep apnea (327 23) (G47 33)   32  Onychomycosis (110 1) (B35 1)   33  Osteoarthritis cervical spine (721 0) (M4 812)   34   Pain of lower leg, unspecified laterality (729 5) (M79 669)   35  Palpitations (785 1) (R00 2)   36  Periorbital cellulitis of right eye (682 0) (L03 213)   37  Postgastrectomy malabsorption (579 3) (K91 2,Z90 3)   38  PPD screening test (V74 1) (Z11 1)   39  Preoperative clearance (V72 84) (Z01 818)   40  Pulmonary embolism (415 19) (I26 99)   41  Reactive hypoglycemia (251 2) (E16 1)   42  Right eye injury (921 9) (S05 91XA)   43  Screen for sexually transmitted diseases (V74 5) (Z11 3)   44  Shortness of breath (786 05) (R06 02)   45  Sinus bradycardia (427 89) (R00 1)   46  Status post bariatric surgery (V45 86) (Z98 84)   47  Strain of Achilles tendon, left, initial encounter (845 09) (S86 012A)   48  Subcutaneous cyst (706 2) (L72 9)   49  Tinea cruris (110 3) (B35 6)   50  Transient paralysis (781 4) (R29 5)   51  Trigger finger (727 03) (M65 30)   52  Vertigo (780 4) (R42)   53  Visit for screening mammogram (V76 12) (Z12 31)   54  Well adult on routine health check (V70 0) (Z00 00)    Current Meds   1  Baclofen 10 MG Oral Tablet; take 1 tablet 3 times daily as needed for muscle spasm; Therapy: 00XTU8991 to (Evaluate:54Twc3227)  Requested for: 29HGI9470; Last   LX:73HMZ5927 Ordered   2  Calcium Citrate + D3 TABS; TAKE 1 TABLET 3 times daily; Therapy: (Recorded:17Nov2015) to Recorded   3  FreeStyle Lancets Miscellaneous; Therapy: 22HAM3371 to (Evaluate:22Jun2014) Recorded   4  FreeStyle Lite Device; Therapy: 60GQA3298 to (Evaluate:22Jun2014) Recorded   5  FreeStyle Lite Test In [employer]; Therapy: 17KWT1132 to (Evaluate:17Jun2014) Recorded   6  Loratadine 10 MG Oral Tablet; TAKE 1 TABLET DAILY  Patient to contact PCP for   additional refills; Therapy: 75KQM6680 to (26 100188)  Requested for: 37CGJ4115; Last   Rx:09Jan2017 Ordered   7  Montelukast Sodium 10 MG Oral Tablet; TAKE 1 TABLET IN THE EVENING; Therapy: 91TOB0390 to (Evaluate:17Jun2017)  Requested for: 89TJE9261; Last   Rx:08Qrb4148 Ordered   8   Multivitamins CHEW Recorded   9  Senna Laxative 8 6 MG Oral Tablet; Take 1 tablet by mouth at bedtime as needed for   constipation; Therapy: 08Ens1629 to (Evaluate:31Zsi2429)  Requested for: 96YEF1577; Last   Rx:02Mar2017 Ordered   10  Sertraline HCl - 100 MG Oral Tablet; TAKE 1 AND 1/2 TABLETS DAILY; Therapy: (Recorded:29Jan2014) to Recorded   11  TraZODone HCl - 100 MG Oral Tablet; as directed; Therapy: (Recorded:17Nov2015) to Recorded   12  Ventolin  (90 Base) MCG/ACT Inhalation Aerosol Solution; INHALE 2 PUFFS    EVERY 4-6 HOURS AS NEEDED; Therapy: 54AWP5518 to (GOLGFRBQ:02VRS6635)  Requested for: 64ZYB7245; Last    Rx:09Jan2017 Ordered   13  Vitamin D3 5000 UNIT Oral Capsule; TAKE 1 CAPSULE BY MOUTH EVERY DAY; Therapy: 04DDL6777 to (Evaluate:04Jan2018)  Requested for: 08IMK5908; Last    Rx:09Jan2017 Ordered   14  Xarelto 20 MG Oral Tablet; take one tablet daily with food; Therapy: 99NTE5862 to ([phone])  Requested for: 31RPU6198; Last    Rx:09Jan2017 Ordered    Allergies    1  Neosporin + Pain Relief Max St OINT    2   Adhesive Tape    Signatures   Electronically signed by : PARUL Irvin,LISBETH; Apr 11 2017 11:16AM EST                       (Author)

## 2018-01-16 NOTE — RESULT NOTES
Dear Johnny Mahmood,   Your test results have returned and are listed below:      Discussion/Summary  Your results show some abnormalities  Your HDL cholesterol is elevated at 68 mg/dL  This is not concerning  Please follow-up with you primary care physician (PCP) if you have any questions  Your albumin which is a measure of protein stores is slightly low at 3 2 g/dL  Please be sure to focus on consuming a lean protein source at all meals and snacks  I have in enclosed a list of dietary sources of lean proteins for you  Your folate level was elevated  This is not concerning  It may indicate that you are consuming too much folate daily  If you have questions please call our office  Your vitamin B12 was elevated  This is not concerning but may indicate that you are consuming more vitamin B12 that is necessary daily  The recommended daily dose is 350-500mcg  If you have any questions please call our office  I did not received results for a vitamin B1 level  Enclosed is a lab slip to obtain a vitamin B1 level  Please complete at your earliest convenience  If you have already completed this bloodwork, please forward it to our office and disregard the enclosed lab slip    Please keep your regularly scheduled follow-up appointment  If you do not have a follow-up scheduled, please call the office to schedule a follow-up visit  If you have any questions, please don't hesitate to call the office  Sincerely,      Signatures   Electronically signed by : DANIELLE Tate; Feb 2 2017  2:57PM EST                       (Author)    Electronically signed by :  JEAN Franco ; Mar 16 2017 11:00AM EST

## 2018-01-18 NOTE — PROGRESS NOTES
Active Problems    1  Abnormal chest CT (793 2) (R93 8)   2  Allergic rhinitis (477 9) (J30 9)   3  Asthma (493 90) (J45 909)   4  Back pain (724 5) (M54 9)   5  Carpal tunnel syndrome on both sides (354 0) (G56 01,G56 02)   6  Chills (780 64) (R68 83)   7  Chronic obstructive pulmonary disease (496) (J44 9)   8  Constipation (564 00) (K59 00)   9  Diabetes mellitus (250 00) (E11 9)   10  Edema (782 3) (R60 9)   11  Encounter for routine gynecological examination with Papanicolaou smear of cervix    (V72 31,V76 2) (Z01 419,Z12 4)   12  Encounter for screening mammogram for malignant neoplasm of breast (V76 12)    (Z12 31)   13  Fatigue (780 79) (R53 83)   14  Gastritis (535 50) (K29 70)   15  Group B streptococcal infection (041 02) (B95 1)   16  Herniated cervical disc (722 0) (M50 20)   17  Hyperlipidemia (272 4) (E78 5)   18  Left maxillary sinusitis (473 0) (J32 0)   19  Low back pain (724 2) (M54 5)   20  Lumbar radiculopathy (724 4) (M54 16)   21  Need for prophylactic vaccination and inoculation against influenza (V04 81) (Z23)   22  Nonspecific abnormal findings on radiological and examination of lung field (793 19)    (R91 8)   23  Obstructive sleep apnea (327 23) (G47 33)   24  Onychomycosis (110 1) (B35 1)   25  Osteoarthritis cervical spine (721 0) (M47 812)   26  Pain of lower leg, unspecified laterality (729 5) (M79 669)   27  Palpitations (785 1) (R00 2)   28  Periorbital cellulitis of right eye (682 0) (L03 211)   29  Postgastrectomy malabsorption (579 3) (K91 2)   30  Preoperative clearance (V72 84) (Z01 818)   31  Pulmonary embolism (415 19) (I26 99)   32  Right bundle branch block (RBBB) (426 4) (I45 10)   33  Right eye injury (921 9) (S05 91XA)   34  Screen for sexually transmitted diseases (V74 5) (Z11 3)   35  Shortness of breath (786 05) (R06 02)   36  Sinus bradycardia (427 89) (R00 1)   37  Status post bariatric surgery (V45 86) (Z98 84)   38  Subcutaneous cyst (706 2) (L72 9)   39   Tinea cruris (110 3) (B35 6)   40  Transient paralysis (781 4) (R29 5)   41  Trigger finger (727 03) (M65 30)   42  Vertigo (780 4) (R42)   43  Vitamin D deficiency (268 9) (E55 9)    Current Meds   1  Baclofen 10 MG Oral Tablet; take 1 tablet 3 times daily as needed for muscle spasm; Therapy: 04FRH6928 to (Evaluate:98Wcu2769)  Requested for: 67HMQ2679; Last   Rx:07Jan2016 Ordered   2  Calcium Citrate + D3 TABS; TAKE 1 TABLET 3 times daily; Therapy: (Recorded:17Nov2015) to Recorded   3  Clotrimazole 1 % External Cream (Lotrimin AF); APPLY 2-3 TIMES DAILY TO   AFFECTED AREA(S); Therapy: 14CQG5005 to (Alysha Juli)  Requested for: 17Yhp9117; Last   Rx:97Xqo8067; Status: ACTIVE - Renewal Denied Ordered   4  DocQLace 100 MG Oral Capsule; TAKE 1 CAPSULE TWICE DAILY; Therapy: 14NPH5937 to ([phone])  Requested for: 38TOL3529; Last   OQ:34WXR0316 Ordered   5  Docusate Sodium 100 MG Oral Capsule; TAKE 1 CAPSULE TWICE DAILY; Therapy: 10MMG5500 to (Evaluate:17Feb2016)  Requested for: 79CHM0451; Last   Rx:20Oct2015 Ordered   6  FreeStyle Lancets Miscellaneous; Therapy: 32ISM6118 to (Evaluate:22Jun2014) Recorded   7  FreeStyle Lite Device; Therapy: 52DCT3225 to (Evaluate:22Jun2014) Recorded   8  FreeStyle Lite Test In [employer]; Therapy: 78BLW2433 to (Evaluate:17Jun2014) Recorded   9  Loratadine 10 MG Oral Tablet; Take 1 tablet daily; Therapy: 01ZCO2507 to ([phone])  Requested for: 47JVF6640; Last   WN:13GBO1132 Ordered   10  Montelukast Sodium 10 MG Oral Tablet; take 1 tablet at bedtime; Therapy: 59GOL7906 to ([phone])  Requested for: 67SVK1781; Last    Rx:07Jan2016 Ordered   6  Multivitamins CHEW Recorded   12  Nyamyc 396884 UNIT/GM External Powder; apply sparingly to affected area(s) 3 times a    day; Therapy: 13Ooy0126 to (03 17 74 30 53)  Requested for: 02Izd4149; Last    Rx:80Hsc5028 Ordered   13   Nystatin 346457 UNIT/GM External Powder; APPLY SPARINGLY TO AFFECTED    AREA(S) 3 TIMES A DAY; Therapy: 50VER6184 to (Cesar Jake)  Requested for: 54Glj6683; Last    Rx:96Osl2383 Ordered   14  Omeprazole 20 MG Oral Capsule Delayed Release; Take one capsule daily; Therapy: 05MXS3186 to (Evaluate:21Mar2016)  Requested for: 38QWX3088; Last    Rx:17Qtl0301 Ordered   15  Sertraline HCl - 100 MG Oral Tablet; TAKE 1 AND 1/2 TABLETS DAILY; Therapy: (Recorded:29Jan2014) to Recorded   16  Symbicort 80-4 5 MCG/ACT Inhalation Aerosol; INHALE 2 PUFFS TWICE DAILY  RINSE    MOUTH AFTER USE; Therapy: 88PMI9329 to (Last Rx:20Oct2015)  Requested for: 20Oct2015 Ordered   17  TraZODone HCl - 100 MG Oral Tablet; as directed; Therapy: (Recorded:17Nov2015) to Recorded   18  Ventolin  (90 Base) MCG/ACT Inhalation Aerosol Solution; INHALE 2 PUFFS    EVERY 4-6 HOURS AS NEEDED; Therapy: 20GGR9049 to (Complete:25Aug2016)  Requested for: 34BJP1009; Last    Rx:02Lfg4800; Status: ACTIVE - Renewal Denied Ordered   19  Xarelto 20 MG Oral Tablet; take one tablet daily with food; Therapy: 34CDN0292 to (Evaluate:13Mar2016)  Requested for: 67OYH8340; Last    Rx:13Ewq2894 Ordered    Allergies    1  Neosporin + Pain Relief Max St OINT    2  Adhesive Tape    Discussion/Summary    Pt called requesting follow-up to review post-op diet and vitamins   Scheduled pt for Thursday, 2/11/16 @ 1:30pm       Signatures   Electronically signed by : JOEY Payton; Jan 26 2016  4:10PM EST                       (Author)

## 2018-01-22 VITALS
HEART RATE: 68 BPM | OXYGEN SATURATION: 98 % | DIASTOLIC BLOOD PRESSURE: 70 MMHG | RESPIRATION RATE: 20 BRPM | SYSTOLIC BLOOD PRESSURE: 110 MMHG | TEMPERATURE: 97.9 F | HEIGHT: 64 IN | BODY MASS INDEX: 35 KG/M2 | WEIGHT: 205 LBS

## 2018-01-22 VITALS
SYSTOLIC BLOOD PRESSURE: 134 MMHG | HEIGHT: 63 IN | RESPIRATION RATE: 18 BRPM | OXYGEN SATURATION: 98 % | DIASTOLIC BLOOD PRESSURE: 72 MMHG | WEIGHT: 193 LBS | BODY MASS INDEX: 34.2 KG/M2 | HEART RATE: 65 BPM | TEMPERATURE: 97.1 F

## 2018-01-22 VITALS
DIASTOLIC BLOOD PRESSURE: 79 MMHG | BODY MASS INDEX: 33.83 KG/M2 | HEART RATE: 97 BPM | SYSTOLIC BLOOD PRESSURE: 132 MMHG | WEIGHT: 191 LBS

## 2018-02-10 DIAGNOSIS — M54.5 LOW BACK PAIN, UNSPECIFIED BACK PAIN LATERALITY, UNSPECIFIED CHRONICITY, WITH SCIATICA PRESENCE UNSPECIFIED: Primary | ICD-10-CM

## 2018-02-12 RX ORDER — BACLOFEN 10 MG/1
TABLET ORAL
Qty: 90 TABLET | Refills: 1 | Status: SHIPPED | OUTPATIENT
Start: 2018-02-12

## 2018-04-09 RX ORDER — UREA 10 %
1 LOTION (ML) TOPICAL
COMMUNITY
Start: 2014-08-08

## 2018-04-09 RX ORDER — SENNA PLUS 8.6 MG/1
TABLET ORAL
COMMUNITY
Start: 2016-02-22

## 2018-04-13 PROBLEM — K91.2 POSTSURGICAL MALABSORPTION: Status: ACTIVE | Noted: 2018-04-13

## 2018-04-13 PROBLEM — Z98.84 BARIATRIC SURGERY STATUS: Status: ACTIVE | Noted: 2018-04-13

## 2018-04-13 PROBLEM — E16.1 REACTIVE HYPOGLYCEMIA: Status: ACTIVE | Noted: 2017-04-07

## 2018-04-17 DIAGNOSIS — M54.5 LOW BACK PAIN, UNSPECIFIED BACK PAIN LATERALITY, UNSPECIFIED CHRONICITY, WITH SCIATICA PRESENCE UNSPECIFIED: ICD-10-CM

## 2018-04-17 PROBLEM — M25.511 RIGHT SHOULDER PAIN: Status: ACTIVE | Noted: 2017-07-18

## 2018-04-17 PROBLEM — R87.629 ABNORMAL PAPANICOLAOU SMEAR OF VAGINA: Status: ACTIVE | Noted: 2017-09-21

## 2018-04-17 PROBLEM — E16.2 HYPOGLYCEMIA: Status: ACTIVE | Noted: 2017-09-25

## 2018-04-17 RX ORDER — LORATADINE 10 MG/1
TABLET ORAL
Qty: 90 TABLET | Refills: 1 | OUTPATIENT
Start: 2018-04-17

## 2018-04-17 RX ORDER — RIVAROXABAN 20 MG/1
TABLET, FILM COATED ORAL
Qty: 90 TABLET | Refills: 1 | OUTPATIENT
Start: 2018-04-17

## 2018-04-17 RX ORDER — BACLOFEN 10 MG/1
TABLET ORAL
Qty: 90 TABLET | Refills: 1 | OUTPATIENT
Start: 2018-04-17

## 2018-05-14 RX ORDER — RIVAROXABAN 20 MG/1
TABLET, FILM COATED ORAL
Qty: 90 TABLET | Refills: 1 | OUTPATIENT
Start: 2018-05-14

## 2018-07-18 ENCOUNTER — TELEPHONE (OUTPATIENT)
Dept: OBGYN CLINIC | Facility: CLINIC | Age: 58
End: 2018-07-18

## 2018-07-18 NOTE — TELEPHONE ENCOUNTER
Spoke to patient on the phone, she is due for her annual exam after 8/16   Patient is going to call back to schedule her apt

## 2018-09-11 ENCOUNTER — DOCUMENTATION (OUTPATIENT)
Dept: OBGYN CLINIC | Facility: CLINIC | Age: 58
End: 2018-09-11

## 2018-09-27 RX ORDER — CHOLECALCIFEROL (VITAMIN D3) 125 MCG
CAPSULE ORAL
Qty: 90 CAPSULE | Refills: 1 | OUTPATIENT
Start: 2018-09-27

## 2018-10-08 ENCOUNTER — TELEPHONE (OUTPATIENT)
Dept: OBGYN CLINIC | Facility: CLINIC | Age: 58
End: 2018-10-08

## 2019-11-04 ENCOUNTER — TELEPHONE (OUTPATIENT)
Dept: BARIATRICS | Facility: CLINIC | Age: 59
End: 2019-11-04

## 2019-11-04 NOTE — TELEPHONE ENCOUNTER
attempted to reach patient to schedule an overdue f/u apt - pts phone is not accepting calls at this time  - mailing a letter       ----- Message from Turner Bishop RN sent at 2019  8:43 AM EST -----  Regardin year f/u appointment  Please contact patient to schedule a 5 year follow up appointment  Thank You

## 2025-05-30 ENCOUNTER — TELEPHONE (OUTPATIENT)
Age: 65
End: 2025-05-30

## 2025-05-30 NOTE — TELEPHONE ENCOUNTER
[facility] called in to schedule visit for patient   They will be faxing over sleep consult referral and sleep study referral  Advised once referral for sleep study is reviewed we can schedule

## 2025-08-22 ENCOUNTER — OFFICE VISIT (OUTPATIENT)
Dept: SLEEP CENTER | Facility: CLINIC | Age: 65
End: 2025-08-22
Payer: MEDICARE

## 2025-08-22 VITALS
WEIGHT: 245 LBS | DIASTOLIC BLOOD PRESSURE: 74 MMHG | OXYGEN SATURATION: 97 % | BODY MASS INDEX: 43.4 KG/M2 | HEART RATE: 58 BPM | SYSTOLIC BLOOD PRESSURE: 147 MMHG

## 2025-08-22 DIAGNOSIS — J30.9 ALLERGIC RHINITIS, UNSPECIFIED SEASONALITY, UNSPECIFIED TRIGGER: ICD-10-CM

## 2025-08-22 DIAGNOSIS — F41.9 ANXIETY AND DEPRESSION: ICD-10-CM

## 2025-08-22 DIAGNOSIS — I27.82 CHRONIC PULMONARY EMBOLISM, UNSPECIFIED PULMONARY EMBOLISM TYPE, UNSPECIFIED WHETHER ACUTE COR PULMONALE PRESENT (HCC): ICD-10-CM

## 2025-08-22 DIAGNOSIS — J45.20 MILD INTERMITTENT ASTHMA WITHOUT COMPLICATION: ICD-10-CM

## 2025-08-22 DIAGNOSIS — G47.09 OTHER INSOMNIA: ICD-10-CM

## 2025-08-22 DIAGNOSIS — F32.A ANXIETY AND DEPRESSION: ICD-10-CM

## 2025-08-22 DIAGNOSIS — J44.9 CHRONIC OBSTRUCTIVE PULMONARY DISEASE, UNSPECIFIED COPD TYPE (HCC): ICD-10-CM

## 2025-08-22 DIAGNOSIS — G47.8 NON-RESTORATIVE SLEEP: ICD-10-CM

## 2025-08-22 DIAGNOSIS — E66.01 MORBID OBESITY (HCC): ICD-10-CM

## 2025-08-22 DIAGNOSIS — G47.33 OBSTRUCTIVE SLEEP APNEA SYNDROME: Primary | ICD-10-CM

## 2025-08-22 PROCEDURE — G2211 COMPLEX E/M VISIT ADD ON: HCPCS | Performed by: INTERNAL MEDICINE

## 2025-08-22 PROCEDURE — 99204 OFFICE O/P NEW MOD 45 MIN: CPT | Performed by: INTERNAL MEDICINE
